# Patient Record
Sex: MALE | Race: OTHER | Employment: OTHER | URBAN - METROPOLITAN AREA
[De-identification: names, ages, dates, MRNs, and addresses within clinical notes are randomized per-mention and may not be internally consistent; named-entity substitution may affect disease eponyms.]

---

## 2017-11-30 ENCOUNTER — HOSPITAL ENCOUNTER (INPATIENT)
Age: 67
LOS: 1 days | Discharge: ANOTHER HOSPITAL | DRG: 378 | End: 2017-12-01
Attending: STUDENT IN AN ORGANIZED HEALTH CARE EDUCATION/TRAINING PROGRAM | Admitting: FAMILY MEDICINE
Payer: SELF-PAY

## 2017-11-30 ENCOUNTER — APPOINTMENT (OUTPATIENT)
Dept: CT IMAGING | Age: 67
DRG: 378 | End: 2017-11-30
Attending: FAMILY MEDICINE
Payer: SELF-PAY

## 2017-11-30 ENCOUNTER — APPOINTMENT (OUTPATIENT)
Dept: GENERAL RADIOLOGY | Age: 67
DRG: 378 | End: 2017-11-30
Attending: STUDENT IN AN ORGANIZED HEALTH CARE EDUCATION/TRAINING PROGRAM
Payer: SELF-PAY

## 2017-11-30 DIAGNOSIS — K92.2 GASTROINTESTINAL HEMORRHAGE, UNSPECIFIED GASTROINTESTINAL HEMORRHAGE TYPE: Primary | ICD-10-CM

## 2017-11-30 LAB
ALBUMIN SERPL-MCNC: 3.2 G/DL (ref 3.5–5)
ALBUMIN/GLOB SERPL: 0.5 {RATIO} (ref 1.1–2.2)
ALP SERPL-CCNC: 128 U/L (ref 45–117)
ALT SERPL-CCNC: 38 U/L (ref 12–78)
ANION GAP SERPL CALC-SCNC: 9 MMOL/L (ref 5–15)
AST SERPL-CCNC: 33 U/L (ref 15–37)
BASOPHILS # BLD: 0 K/UL (ref 0–0.1)
BASOPHILS NFR BLD: 0 % (ref 0–1)
BILIRUB SERPL-MCNC: 0.5 MG/DL (ref 0.2–1)
BUN SERPL-MCNC: 21 MG/DL (ref 6–20)
BUN/CREAT SERPL: 15 (ref 12–20)
CALCIUM SERPL-MCNC: 10.2 MG/DL (ref 8.5–10.1)
CHLORIDE SERPL-SCNC: 94 MMOL/L (ref 97–108)
CO2 SERPL-SCNC: 31 MMOL/L (ref 21–32)
CREAT SERPL-MCNC: 1.43 MG/DL (ref 0.7–1.3)
DIFFERENTIAL METHOD BLD: ABNORMAL
EOSINOPHIL # BLD: 0 K/UL (ref 0–0.4)
EOSINOPHIL NFR BLD: 0 % (ref 0–7)
ERYTHROCYTE [DISTWIDTH] IN BLOOD BY AUTOMATED COUNT: 15.8 % (ref 11.5–14.5)
GLOBULIN SER CALC-MCNC: 6.9 G/DL (ref 2–4)
GLUCOSE SERPL-MCNC: 185 MG/DL (ref 65–100)
HCT VFR BLD AUTO: 37.7 % (ref 36.6–50.3)
HEMOCCULT STL QL: POSITIVE
HGB BLD-MCNC: 12.1 G/DL (ref 12.1–17)
LACTATE SERPL-SCNC: 2.1 MMOL/L (ref 0.4–2)
LYMPHOCYTES # BLD: 0.5 K/UL (ref 0.8–3.5)
LYMPHOCYTES NFR BLD: 4 % (ref 12–49)
MCH RBC QN AUTO: 27.6 PG (ref 26–34)
MCHC RBC AUTO-ENTMCNC: 32.1 G/DL (ref 30–36.5)
MCV RBC AUTO: 85.9 FL (ref 80–99)
MONOCYTES # BLD: 0.4 K/UL (ref 0–1)
MONOCYTES NFR BLD: 3 % (ref 5–13)
NEUTS SEG # BLD: 12.7 K/UL (ref 1.8–8)
NEUTS SEG NFR BLD: 93 % (ref 32–75)
PLATELET # BLD AUTO: 478 K/UL (ref 150–400)
POTASSIUM SERPL-SCNC: 3.8 MMOL/L (ref 3.5–5.1)
PROT SERPL-MCNC: 10.1 G/DL (ref 6.4–8.2)
RBC # BLD AUTO: 4.39 M/UL (ref 4.1–5.7)
RBC MORPH BLD: ABNORMAL
SODIUM SERPL-SCNC: 134 MMOL/L (ref 136–145)
WBC # BLD AUTO: 13.6 K/UL (ref 4.1–11.1)

## 2017-11-30 PROCEDURE — 85025 COMPLETE CBC W/AUTO DIFF WBC: CPT | Performed by: STUDENT IN AN ORGANIZED HEALTH CARE EDUCATION/TRAINING PROGRAM

## 2017-11-30 PROCEDURE — 99284 EMERGENCY DEPT VISIT MOD MDM: CPT

## 2017-11-30 PROCEDURE — 71010 XR CHEST PORT: CPT

## 2017-11-30 PROCEDURE — 96375 TX/PRO/DX INJ NEW DRUG ADDON: CPT

## 2017-11-30 PROCEDURE — 65660000000 HC RM CCU STEPDOWN

## 2017-11-30 PROCEDURE — 82272 OCCULT BLD FECES 1-3 TESTS: CPT | Performed by: STUDENT IN AN ORGANIZED HEALTH CARE EDUCATION/TRAINING PROGRAM

## 2017-11-30 PROCEDURE — 96361 HYDRATE IV INFUSION ADD-ON: CPT

## 2017-11-30 PROCEDURE — 74011250636 HC RX REV CODE- 250/636: Performed by: STUDENT IN AN ORGANIZED HEALTH CARE EDUCATION/TRAINING PROGRAM

## 2017-11-30 PROCEDURE — 80053 COMPREHEN METABOLIC PANEL: CPT | Performed by: STUDENT IN AN ORGANIZED HEALTH CARE EDUCATION/TRAINING PROGRAM

## 2017-11-30 PROCEDURE — 96374 THER/PROPH/DIAG INJ IV PUSH: CPT

## 2017-11-30 PROCEDURE — 83605 ASSAY OF LACTIC ACID: CPT | Performed by: STUDENT IN AN ORGANIZED HEALTH CARE EDUCATION/TRAINING PROGRAM

## 2017-11-30 PROCEDURE — 93005 ELECTROCARDIOGRAM TRACING: CPT

## 2017-11-30 PROCEDURE — 74011000250 HC RX REV CODE- 250: Performed by: STUDENT IN AN ORGANIZED HEALTH CARE EDUCATION/TRAINING PROGRAM

## 2017-11-30 PROCEDURE — C9113 INJ PANTOPRAZOLE SODIUM, VIA: HCPCS | Performed by: STUDENT IN AN ORGANIZED HEALTH CARE EDUCATION/TRAINING PROGRAM

## 2017-11-30 PROCEDURE — 36415 COLL VENOUS BLD VENIPUNCTURE: CPT | Performed by: STUDENT IN AN ORGANIZED HEALTH CARE EDUCATION/TRAINING PROGRAM

## 2017-11-30 PROCEDURE — 74176 CT ABD & PELVIS W/O CONTRAST: CPT

## 2017-11-30 RX ORDER — ONDANSETRON 4 MG/1
4 TABLET, ORALLY DISINTEGRATING ORAL
COMMUNITY

## 2017-11-30 RX ORDER — ACETAMINOPHEN 325 MG/1
650 TABLET ORAL EVERY 8 HOURS
COMMUNITY

## 2017-11-30 RX ORDER — AMLODIPINE BESYLATE 10 MG/1
10 TABLET ORAL DAILY
COMMUNITY

## 2017-11-30 RX ORDER — ACETAMINOPHEN 160 MG/5ML
320 LIQUID ORAL
COMMUNITY

## 2017-11-30 RX ORDER — ONDANSETRON 2 MG/ML
4 INJECTION INTRAMUSCULAR; INTRAVENOUS
Status: COMPLETED | OUTPATIENT
Start: 2017-11-30 | End: 2017-11-30

## 2017-11-30 RX ORDER — GUAIFENESIN 100 MG/5ML
81 LIQUID (ML) ORAL DAILY
COMMUNITY
End: 2017-12-01

## 2017-11-30 RX ORDER — FAMOTIDINE 40 MG/5ML
1 POWDER, FOR SUSPENSION ORAL
COMMUNITY

## 2017-11-30 RX ORDER — CLONIDINE HYDROCHLORIDE 0.2 MG/1
0.2 TABLET ORAL EVERY 12 HOURS
COMMUNITY

## 2017-11-30 RX ORDER — HYDRALAZINE HYDROCHLORIDE 20 MG/ML
10 INJECTION INTRAMUSCULAR; INTRAVENOUS
Status: DISCONTINUED | OUTPATIENT
Start: 2017-11-30 | End: 2017-12-02 | Stop reason: HOSPADM

## 2017-11-30 RX ORDER — ATORVASTATIN CALCIUM 40 MG/1
40 TABLET, FILM COATED ORAL DAILY
COMMUNITY

## 2017-11-30 RX ORDER — CLOPIDOGREL BISULFATE 75 MG/1
75 TABLET ORAL DAILY
COMMUNITY
End: 2017-12-01

## 2017-11-30 RX ADMIN — SODIUM CHLORIDE 1000 ML: 900 INJECTION, SOLUTION INTRAVENOUS at 19:55

## 2017-11-30 RX ADMIN — ONDANSETRON 4 MG: 2 INJECTION INTRAMUSCULAR; INTRAVENOUS at 19:55

## 2017-11-30 RX ADMIN — SODIUM CHLORIDE 40 MG: 9 INJECTION, SOLUTION INTRAMUSCULAR; INTRAVENOUS; SUBCUTANEOUS at 20:49

## 2017-11-30 NOTE — ED TRIAGE NOTES
Pt arrives via EMS from [facility] c/o bloody stools that started yesterday and vomiting that started today. Per EMS pt has been on a clear liquid diet starting today. Pt arrives with PEG in place. Per EMS Glucose 212 in route.

## 2017-12-01 VITALS
RESPIRATION RATE: 16 BRPM | SYSTOLIC BLOOD PRESSURE: 167 MMHG | OXYGEN SATURATION: 99 % | HEART RATE: 100 BPM | BODY MASS INDEX: 21.35 KG/M2 | TEMPERATURE: 97.8 F | DIASTOLIC BLOOD PRESSURE: 90 MMHG | WEIGHT: 113.1 LBS | HEIGHT: 61 IN

## 2017-12-01 LAB
ALBUMIN SERPL-MCNC: 3 G/DL (ref 3.5–5)
ALBUMIN/GLOB SERPL: 0.4 {RATIO} (ref 1.1–2.2)
ALP SERPL-CCNC: 129 U/L (ref 45–117)
ALT SERPL-CCNC: 37 U/L (ref 12–78)
ANION GAP SERPL CALC-SCNC: 10 MMOL/L (ref 5–15)
APPEARANCE UR: ABNORMAL
AST SERPL-CCNC: 44 U/L (ref 15–37)
ATRIAL RATE: 75 BPM
BACTERIA URNS QL MICRO: ABNORMAL /HPF
BASOPHILS # BLD: 0 K/UL (ref 0–0.1)
BASOPHILS NFR BLD: 0 % (ref 0–1)
BILIRUB SERPL-MCNC: 0.4 MG/DL (ref 0.2–1)
BILIRUB UR QL CFM: NEGATIVE
BUN SERPL-MCNC: 17 MG/DL (ref 6–20)
BUN/CREAT SERPL: 15 (ref 12–20)
C DIFF TOX GENS STL QL NAA+PROBE: NEGATIVE
CALCIUM SERPL-MCNC: 9.4 MG/DL (ref 8.5–10.1)
CALCULATED P AXIS, ECG09: 21 DEGREES
CALCULATED R AXIS, ECG10: -15 DEGREES
CALCULATED T AXIS, ECG11: 92 DEGREES
CHLORIDE SERPL-SCNC: 97 MMOL/L (ref 97–108)
CO2 SERPL-SCNC: 28 MMOL/L (ref 21–32)
COLOR UR: ABNORMAL
CREAT SERPL-MCNC: 1.12 MG/DL (ref 0.7–1.3)
DIAGNOSIS, 93000: NORMAL
EOSINOPHIL # BLD: 0 K/UL (ref 0–0.4)
EOSINOPHIL NFR BLD: 0 % (ref 0–7)
EPITH CASTS URNS QL MICRO: ABNORMAL /LPF
ERYTHROCYTE [DISTWIDTH] IN BLOOD BY AUTOMATED COUNT: 16 % (ref 11.5–14.5)
GLOBULIN SER CALC-MCNC: 6.9 G/DL (ref 2–4)
GLUCOSE BLD STRIP.AUTO-MCNC: 113 MG/DL (ref 65–100)
GLUCOSE BLD STRIP.AUTO-MCNC: 125 MG/DL (ref 65–100)
GLUCOSE BLD STRIP.AUTO-MCNC: 127 MG/DL (ref 65–100)
GLUCOSE BLD STRIP.AUTO-MCNC: 135 MG/DL (ref 65–100)
GLUCOSE SERPL-MCNC: 138 MG/DL (ref 65–100)
GLUCOSE UR STRIP.AUTO-MCNC: 100 MG/DL
HCT VFR BLD AUTO: 36.3 % (ref 36.6–50.3)
HGB BLD-MCNC: 11.5 G/DL (ref 12.1–17)
HGB UR QL STRIP: ABNORMAL
KETONES UR QL STRIP.AUTO: 15 MG/DL
LACTATE SERPL-SCNC: 1.6 MMOL/L (ref 0.4–2)
LEUKOCYTE ESTERASE UR QL STRIP.AUTO: ABNORMAL
LYMPHOCYTES # BLD: 1.4 K/UL (ref 0.8–3.5)
LYMPHOCYTES NFR BLD: 8 % (ref 12–49)
MCH RBC QN AUTO: 27.1 PG (ref 26–34)
MCHC RBC AUTO-ENTMCNC: 31.7 G/DL (ref 30–36.5)
MCV RBC AUTO: 85.6 FL (ref 80–99)
MONOCYTES # BLD: 1 K/UL (ref 0–1)
MONOCYTES NFR BLD: 6 % (ref 5–13)
NEUTS SEG # BLD: 15.5 K/UL (ref 1.8–8)
NEUTS SEG NFR BLD: 86 % (ref 32–75)
NITRITE UR QL STRIP.AUTO: POSITIVE
P-R INTERVAL, ECG05: 148 MS
PH UR STRIP: 7 [PH] (ref 5–8)
PLATELET # BLD AUTO: 505 K/UL (ref 150–400)
POTASSIUM SERPL-SCNC: 4.2 MMOL/L (ref 3.5–5.1)
PROT SERPL-MCNC: 9.9 G/DL (ref 6.4–8.2)
PROT UR STRIP-MCNC: >300 MG/DL
Q-T INTERVAL, ECG07: 428 MS
QRS DURATION, ECG06: 88 MS
QTC CALCULATION (BEZET), ECG08: 477 MS
RBC # BLD AUTO: 4.24 M/UL (ref 4.1–5.7)
RBC #/AREA URNS HPF: >100 /HPF (ref 0–5)
SERVICE CMNT-IMP: ABNORMAL
SODIUM SERPL-SCNC: 135 MMOL/L (ref 136–145)
SP GR UR REFRACTOMETRY: 1.01 (ref 1–1.03)
UA: UC IF INDICATED,UAUC: ABNORMAL
UROBILINOGEN UR QL STRIP.AUTO: 2 EU/DL (ref 0.2–1)
VENTRICULAR RATE, ECG03: 75 BPM
WBC # BLD AUTO: 17.9 K/UL (ref 4.1–11.1)
WBC URNS QL MICRO: ABNORMAL /HPF (ref 0–4)

## 2017-12-01 PROCEDURE — 74011250637 HC RX REV CODE- 250/637: Performed by: FAMILY MEDICINE

## 2017-12-01 PROCEDURE — 81001 URINALYSIS AUTO W/SCOPE: CPT | Performed by: INTERNAL MEDICINE

## 2017-12-01 PROCEDURE — 87493 C DIFF AMPLIFIED PROBE: CPT | Performed by: HOSPITALIST

## 2017-12-01 PROCEDURE — 87186 SC STD MICRODIL/AGAR DIL: CPT | Performed by: INTERNAL MEDICINE

## 2017-12-01 PROCEDURE — 80053 COMPREHEN METABOLIC PANEL: CPT | Performed by: FAMILY MEDICINE

## 2017-12-01 PROCEDURE — 74011250636 HC RX REV CODE- 250/636: Performed by: HOSPITALIST

## 2017-12-01 PROCEDURE — 82962 GLUCOSE BLOOD TEST: CPT

## 2017-12-01 PROCEDURE — 87077 CULTURE AEROBIC IDENTIFY: CPT | Performed by: INTERNAL MEDICINE

## 2017-12-01 PROCEDURE — 74011250636 HC RX REV CODE- 250/636: Performed by: FAMILY MEDICINE

## 2017-12-01 PROCEDURE — 36415 COLL VENOUS BLD VENIPUNCTURE: CPT | Performed by: FAMILY MEDICINE

## 2017-12-01 PROCEDURE — 74011000250 HC RX REV CODE- 250: Performed by: FAMILY MEDICINE

## 2017-12-01 PROCEDURE — 85025 COMPLETE CBC W/AUTO DIFF WBC: CPT | Performed by: FAMILY MEDICINE

## 2017-12-01 PROCEDURE — C9113 INJ PANTOPRAZOLE SODIUM, VIA: HCPCS | Performed by: FAMILY MEDICINE

## 2017-12-01 PROCEDURE — 65660000000 HC RM CCU STEPDOWN

## 2017-12-01 PROCEDURE — 83605 ASSAY OF LACTIC ACID: CPT | Performed by: FAMILY MEDICINE

## 2017-12-01 PROCEDURE — 87086 URINE CULTURE/COLONY COUNT: CPT | Performed by: INTERNAL MEDICINE

## 2017-12-01 RX ORDER — ATORVASTATIN CALCIUM 40 MG/1
40 TABLET, FILM COATED ORAL DAILY
Status: DISCONTINUED | OUTPATIENT
Start: 2017-12-01 | End: 2017-12-02 | Stop reason: HOSPADM

## 2017-12-01 RX ORDER — SODIUM CHLORIDE 9 MG/ML
75 INJECTION, SOLUTION INTRAVENOUS CONTINUOUS
Status: DISCONTINUED | OUTPATIENT
Start: 2017-12-01 | End: 2017-12-02 | Stop reason: HOSPADM

## 2017-12-01 RX ORDER — CLONIDINE HYDROCHLORIDE 0.2 MG/1
0.2 TABLET ORAL EVERY 12 HOURS
Status: DISCONTINUED | OUTPATIENT
Start: 2017-12-01 | End: 2017-12-02 | Stop reason: HOSPADM

## 2017-12-01 RX ORDER — AMLODIPINE BESYLATE 5 MG/1
10 TABLET ORAL DAILY
Status: DISCONTINUED | OUTPATIENT
Start: 2017-12-01 | End: 2017-12-02 | Stop reason: HOSPADM

## 2017-12-01 RX ORDER — SODIUM CHLORIDE 0.9 % (FLUSH) 0.9 %
5-10 SYRINGE (ML) INJECTION AS NEEDED
Status: DISCONTINUED | OUTPATIENT
Start: 2017-12-01 | End: 2017-12-02 | Stop reason: HOSPADM

## 2017-12-01 RX ORDER — SODIUM CHLORIDE 0.9 % (FLUSH) 0.9 %
5-10 SYRINGE (ML) INJECTION EVERY 8 HOURS
Status: DISCONTINUED | OUTPATIENT
Start: 2017-12-01 | End: 2017-12-02 | Stop reason: HOSPADM

## 2017-12-01 RX ADMIN — Medication 10 ML: at 22:14

## 2017-12-01 RX ADMIN — Medication 10 ML: at 06:51

## 2017-12-01 RX ADMIN — SODIUM CHLORIDE 40 MG: 9 INJECTION INTRAMUSCULAR; INTRAVENOUS; SUBCUTANEOUS at 22:12

## 2017-12-01 RX ADMIN — SODIUM CHLORIDE 75 ML/HR: 900 INJECTION, SOLUTION INTRAVENOUS at 01:30

## 2017-12-01 RX ADMIN — Medication 10 ML: at 13:21

## 2017-12-01 RX ADMIN — NITROGLYCERIN 1 INCH: 20 OINTMENT TOPICAL at 01:15

## 2017-12-01 RX ADMIN — SODIUM CHLORIDE 40 MG: 9 INJECTION INTRAMUSCULAR; INTRAVENOUS; SUBCUTANEOUS at 09:13

## 2017-12-01 RX ADMIN — PIPERACILLIN SODIUM AND TAZOBACTAM SODIUM 3.38 G: .375; 3 INJECTION, POWDER, LYOPHILIZED, FOR SOLUTION INTRAVENOUS at 11:49

## 2017-12-01 RX ADMIN — HYDRALAZINE HYDROCHLORIDE 10 MG: 20 INJECTION INTRAMUSCULAR; INTRAVENOUS at 22:13

## 2017-12-01 RX ADMIN — Medication 10 ML: at 01:17

## 2017-12-01 RX ADMIN — VANCOMYCIN HYDROCHLORIDE 125 MG: 1 INJECTION, POWDER, LYOPHILIZED, FOR SOLUTION INTRAVENOUS at 11:49

## 2017-12-01 RX ADMIN — PIPERACILLIN AND TAZOBACTAM 10.12 G: 3; .375 INJECTION, POWDER, FOR SOLUTION INTRAVENOUS at 13:20

## 2017-12-01 NOTE — DISCHARGE SUMMARY
Discharge Summary       PATIENT ID: Junior Bhandari  MRN: [id number]   YOB: 1950    DATE OF ADMISSION: 11/30/2017  5:35 PM    DATE OF DISCHARGE: 12/1/17   PRIMARY CARE PROVIDER: None     ATTENDING PHYSICIAN: Hung Soni  DISCHARGING PROVIDER: Rodney Rousseau MD    To contact this individual call [phone] and ask the  to page. If unavailable ask to be transferred the Adult Hospitalist Department. CONSULTATIONS: IP CONSULT TO HOSPITALIST  IP CONSULT TO GASTROENTEROLOGY    PROCEDURES/SURGERIES: * No surgery found *    ADMITTING DIAGNOSES & HOSPITAL COURSE:   HISTORY OF PRESENT ILLNESS: The patient is a 58-year-old male   with past medical history of coronary artery disease, colon cancer,   stroke, hypertension and GERD, who presents to the hospital with the   above mentioned symptoms. The patient has a history of recent stroke   and thus history was obtained from the son, who is present at the   bedside. The son reports that patient had a colonoscopy done and was   diagnosed with colon cancer and at almost the same time the patient   also had some \"heart problems\" and was found to have coronary   artery disease. The patient was taken for coronary artery bypass   grafting at [address] [place] [address] about a month, month and half back, underwent the   surgery and while he was in the ICU, had a stroke. Per son, the patient   was unable to walk and also had a lot of trouble swallowing and trouble   with speech. The patient eventually had a PEG tube placed and was   sent to [facility]. The patient is recovering well. Per son, he has   regained some speech, he is able to swallow some of his food, and he   is able to walk with the help of a walker and has good strength in his   arms. The son reports that the patient was evaluated by Colorectal   Surgery recently and they are planning to operate on him for colon   cancer after [place] if all the other morbidities are optimized.  Per   son, the patient started developing some poor appetite associated with   constipation about 2 days back. He was given a stool softener and   started complaining of some nausea and vomiting today, and then was   found to have some stools with dark maroon blood yesterday. The   patient also started having some nausea and vomiting associated with   some hematemesis earlier today and the son got concerned and   decided to bring him to the hospital. Currently, the patient appears to   be stable, resting in bed. Per son, the patient has not complained of   any headache, blurry vision, sore throat, trouble swallowing, trouble   with speech other than usual. No chest pain, no shortness of breath. No recent cough, no abdominal pain. No change in neurological status   or falls, injuries or any other concerns or problems.           Assessment & Plan:      1. Gastrointestinal bleeding, appears to be both upper and lower. - hold asa / plavix which was started for a recent stroke    - h/o colon ca - need report from colorectal surgeons office  - cbc q 12 hrs and IV meds cont PPI      2. UTI POA-  - Will start zosyn follow up cultures  - pt was on oral vanc for ? c diff need records   - cant get it for NPO status  - GI to evaluate     2. History of coronary artery disease. Continue to monitor.  - cont home meds  Anti plt stopped for above     3. Hypertension. Currently the patient appears to be suboptimally   Controlled.   - cont IV meds for now     Disposition: [facility] Dr. Flip Kessler ( oncological surgeon )  accepted pt   Pt has a h/o CABG/ STROKE AND COLON CA S/P STENT and follows at [address] [place] [address] by Dr. Flip Kessler , requested for transfer and agreed to plan   D/w pt son updated that pt is being transfered to [address] [place] [address]           PENDING TEST RESULTS:   At the time of discharge the following test results are still pending:     FOLLOW UP APPOINTMENTS:    Follow-up Information     Follow up With Details Comments Contact Info    None  transfer to [facility] None ([phone] Patient stated that they have no PCP             ADDITIONAL CARE RECOMMENDATIONS:     DIET: npo    ACTIVITY: Activity as tolerated    WOUND CARE:     EQUIPMENT needed:       DISCHARGE MEDICATIONS:  Current Discharge Medication List      CONTINUE these medications which have NOT CHANGED    Details   amLODIPine (NORVASC) 10 mg tablet 10 mg by PEG Tube route daily. atorvastatin (LIPITOR) 40 mg tablet 40 mg by PEG Tube route daily. cloNIDine HCl (CATAPRES) 0.2 mg tablet 0.2 mg by PEG Tube route every twelve (12) hours. famotidine (PEPCID) 40 mg/5 mL (8 mg/mL) suspension 1 tsp by PEG Tube route nightly. vancomycin 125 mg/2.5 mL syrg 125 mg by PEG Tube route every six (6) hours. ondansetron (ZOFRAN ODT) 4 mg disintegrating tablet 4 mg by PEG Tube route every four (4) hours as needed for Nausea. X 24hours      acetaminophen (TYLENOL) 325 mg tablet 650 mg by PEG Tube route every eight (8) hours. L. ACIDOPHILUS/L.BULGARICUS (FLORANEX PO) 1 Packet by PEG Tube route three (3) times daily. guar gum (BENEFIBER) packet 1 Packet by PEG Tube route three (3) times daily (with meals). acetaminophen (TYLENOL) 160 mg/5 mL liquid 320 mg by PEG Tube route every six (6) hours as needed for Pain. STOP taking these medications       clopidogrel (PLAVIX) 75 mg tab Comments:   Reason for Stopping:         aspirin 81 mg chewable tablet Comments:   Reason for Stopping:                 NOTIFY YOUR PHYSICIAN FOR ANY OF THE FOLLOWING:   Fever over 101 degrees for 24 hours. Chest pain, shortness of breath, fever, chills, nausea, vomiting, diarrhea, change in mentation, falling, weakness, bleeding. Severe pain or pain not relieved by medications. Or, any other signs or symptoms that you may have questions about.     DISPOSITION:    Home With:   OT  PT  HH  RN       Long term SNF/Inpatient Rehab    Independent/assisted living    Hospice   x Other: [facility]       PATIENT CONDITION AT DISCHARGE:     Functional status   x Poor Deconditioned     Independent      Cognition     Lucid    x Forgetful     Dementia      Catheters/lines (plus indication)    Fink     PICC     PEG    x None      Code status    x Full code     DNR      PHYSICAL EXAMINATION AT DISCHARGE:   Refer to Progress Note      CHRONIC MEDICAL DIAGNOSES:  Problem List as of 12/1/2017  Date Reviewed: 12/1/2017          Codes Class Noted - Resolved    * (Principal)GI bleed ICD-10-CM: K92.2  ICD-9-CM: 578.9  11/30/2017 - Present              Greater than 30  minutes were spent with the patient on counseling and coordination of care    Signed:   Robin Hollingsworth MD  12/1/2017  1:38 PM

## 2017-12-01 NOTE — PROGRESS NOTES
Received update that patient is from [facility]. Reached out to [facility] Liaison for [facility] on cell:  [phone]. Referral has been made to facility via [facility] and request for Nat to assist in researching patient's payor source. Patient is showing as Self-Pay currently in system.     Divya Dickey RN

## 2017-12-01 NOTE — PROGRESS NOTES
NUTRITION COMPLETE ASSESSMENT    RECOMMENDATIONS:   1. Per GI resume diet and tube feeds:   - puree + Ensure Compact TID    - Jevity 1.5 @ 30ml/hr x 12 hrs (7p to 7a) + 100ml flush q4hr throughout the day  2. Assist with all meals and supplements  3. Daily weights     Interventions/Plan:   Food/Nutrient Delivery:    Commercial supplement (once diet advanced add Ensure Compact TID) Feeding assistance at meals   Initiate enteral nutrition    Assessment:   Reason for Assessment: [x]BPA/MST Referral     Diet: NPO  Supplements: none  Nutritionally Significant Medications: [x] Reviewed & Includes: protonix, vanco, NS @ 75ml/hr    Pre-Hospitalization:  Usual Appetite: Fair  Diet at Home: puree  Vitamins/Supplements: Yes (Houseshake TID + nocturnal feeds)    Subjective:  Aphasia. No family at bedside. Objective:  Pt admitted for GI bleed from [facility]. PMHx: CAD, colon CA, stroke, HTN, GERD. N/V prior to admit. Recent CABG with stroke post-op, about 1 month ago per H&P. New dx of colon CA during that admit. GI consulted. Existing PEG in placed prior to admit d/t dysphagia s/p stroke. Now able to take some PO with puree pleasure trays. Spoke to facility staff who note pt eating about 50% B, 34-40%L and <35%D trays with Houseshakes TID (~600kcal, 27g protein if 100% consumed). Does get nocturnal feeds of: Jevity 1.5, 30ml/hr x 12hrs (7p to 7a) + 100ml flush q4hr throughout the day. Provides: 360ml, 540kcal, 23g protein, 274ml free fluid + 600ml flush = 874ml fluid. If 100% supplements consumed and with reported PO pt likely to be meeting nutrition needs. However, wt loss of >33# noted per Malnutrition Screen but no wt hx available or family present for interview. Some wt loss likely d/t loss of muscle mass s/p stroke but will continue to monitor wt trends. Recommend starting with PTA tube feeds regimen (see above), if wt loss noted and/or PO intake not back to baseline may need to increase volume.      Will continue to follow for GI consult/recommendations, restart of diet/tube feeds, PO intake, and wt trends. Estimated Nutrition Needs:   Kcals/day: 1475 Kcals/day (1475-1587kcal)  Protein: 62 g (62-67g (1.2-1.3g/kg))  Fluid: 1500 ml (1ml/kcal)  Based On: Jolly Dang (x 1.3-1.4)  Weight Used: Actual wt (51.3kg)    Pt expected to meet estimated nutrient needs:  []   Yes     [x]  No (without enteral feeds) [] Unable to predict at this time  Nutrition Diagnosis:   1. Inadequate oral intake related to swallowing difficulty as evidenced by dysphagia s/p stroke; PEG with noturnal feeds; puree pleasure trays    2. Unintended weight loss related to physical inactivity, inadequate protein/energy intake PTA as evidenced by recent stroke/CABG/CA dx; reports of wt loss    Goals:     Restart of diet/tube feeds in 1-2 days; wt maintenance     Monitoring & Evaluation:    - Enteral/parenteral nutrition intake, Liquid meal replacement, Total energy intake   - Weight/weight change, GI     Previous Nutrition Goals Met:   N/A  Previous Recommendations:    N/A    Education & Discharge Needs:   [x] None Identified   [] Identified and addressed    [] Participated in care plan, discharge planning, and/or interdisciplinary rounds        Cultural, Adventist and ethnic food preferences identified: None    Skin Integrity: [x]Intact  []Other  Edema: [x]None []Other  Last BM: 11/30  Food Allergies: [x]None []Other  Diet Restrictions: Cultural/Oriental orthodox Preference(s): None     Anthropometrics:    Weight Loss Metrics 12/1/2017 11/30/2017   Today's Wt 113 lb 1.5 oz -   BMI - 21.37 kg/m2      Weight Source: Bed  Height: 5' 1\" (154.9 cm) ([facility] staff reports),    Body mass index is 21.37 kg/(m^2).   IBW : 50.8 kg (112 lb), % IBW (Calculated): 100.98 %   ,      Labs:    Lab Results   Component Value Date/Time    Sodium 135 12/01/2017 01:15 AM    Potassium 4.2 12/01/2017 01:15 AM    Chloride 97 12/01/2017 01:15 AM    CO2 28 12/01/2017 01:15 AM Glucose 138 12/01/2017 01:15 AM    BUN 17 12/01/2017 01:15 AM    Creatinine 1.12 12/01/2017 01:15 AM    Calcium 9.4 12/01/2017 01:15 AM    Albumin 3.0 12/01/2017 01:15 AM       Eleni Veras RD

## 2017-12-01 NOTE — PROGRESS NOTES
Day #1 of Zosyn  Indication:  UTI  Current regimen:  3.375g IV Q8h    Recent Labs      17   0115  17   0105  17   1840   WBC   --   17.9*  13.6*   CREA  1.12   --   1.43*   BUN  17   --   21*     Est CrCl: 45.2 ml/min  Temp (24hrs), Av.2 °F (36.8 °C), Min:98 °F (36.7 °C), Max:98.4 °F (36.9 °C)    Cultures:    urine - in process    Plan: Change to 3.375g followed by 10.125g Q24h.

## 2017-12-01 NOTE — ROUTINE PROCESS
TRANSFER - OUT REPORT:    Verbal report given to Radha Rojo RN on [employer]  being transferred to [facility]) for routine progression of care       Report consisted of patients Situation, Background, Assessment and   Recommendations(SBAR). Information from the following report(s) SBAR, Kardex, ED Summary, [facility] and Recent Results was reviewed with the receiving nurse. Lines:   Peripheral IV 11/30/17 Left Antecubital (Active)   Site Assessment Clean, dry, & intact 11/30/2017  8:06 PM   Phlebitis Assessment 0 11/30/2017  8:06 PM   Infiltration Assessment 0 11/30/2017  8:06 PM   Dressing Status Clean, dry, & intact 11/30/2017  8:06 PM        Opportunity for questions and clarification was provided.       Patient transported with:   Registered Nurse

## 2017-12-01 NOTE — PROGRESS NOTES
0005: Pt arrived to unit with wife and son at bedside;pt stable with no complaints of pain at this time. BP elevated at 180/88; catapres held d/t NPO status  MD paged regarding BP,elevated lactic (2.1), and elevated WBC (13.6) that was not addressed in the ER; orders received to re-draw lactic now then every 4 hrs. until less than 2; Nitrobid 1 in. Ordered for BP control     Primary Nurse Jose Wakefield RN and Annabel Scott RN performed a dual skin assessment on this patient No impairment noted  Daniel score is 21   Unable to complete admission bedside swallow eval; pt NPO    0255: Spoke with Dr. Sheridan Harper about pt's increasing WBC count and orders that were placed for Vancomycin 50mg PO q6, advised to hold vanc at this time considering pt is NPO; no new orders received    Bedside and Verbal shift change report given to Mikayla Dumont (oncoming nurse) by Chris Garcia (offgoing nurse). Report included the following information SBAR, Kardex, ED Summary, Intake/Output, MAR, Recent Results and Cardiac Rhythm NSR.

## 2017-12-01 NOTE — CONSULTS
[address] [facility].   [address] [facility] [address]        GASTROENTEROLOGY CONSULTATION NOTE  Will Dg Kiran  [phone] office  [phone] NP/PA in-hospital cell phone M-F until 4:30PM  After 5PM or on weekends, please call  for physician on call        NAME:  Estiven Bagley   :   1950   MRN:   [id number]       Referring Physician: Dr. Joanna Clarke Date: 2017 10:49 AM    Chief Complaint: unable to obtain     History of Present Illness:  Patient is a 69 y.o. who is seen in consultation at the request of Dr. Landy Patton for upper GI endoscopy for upper GI bleeding. Patient has a past medical history of coronary artery disease, colon cancer, stroke, hypertension, and GERD. Patient has a history of a recent stroke. All of the history was obtained from the patient's records. He is unable to provide any history and there is no family present. I talked to the patient's son on the phone who was unable to talk as he was driving to the hospital.  Per the records, patient had a CABG at [facility] approximately 1 month ago, and had a stroke postoperatively. Patient was unable to walk. He had difficulties with speech and swallowing, and a PEG tube was placed. Patient was evaluated by colorectal surgery and they are planning surgery for colon cancer after [place] and all other morbidities are optimized. Patient has had a poor appetite with associated constipation 2 days ago and was given a stool softener. He started having nausea, vomiting, and stools with dark maroon blood.  + Hematemesis. Discussed history with the patient's nurse. He has had 1 bowel movement today with bright red blood. Asprin and Plavix are being held. I have reviewed the emergency room note, hospital admission note, notes by all other clinicians who have seen the patient during this hospitalization to date. I have reviewed the problem list and the reason for this hospitalization.  I have reviewed the allergies and the medications the patient was taking at home prior to this hospitalization. PMH:  Past Medical History:   Diagnosis Date    CAD (coronary artery disease)     [facility] ([facility]     Colon    Gastrointestinal disorder     GERD    Hypertension     [facility]        PSH:  History reviewed. No pertinent surgical history. Allergies:  No Known Allergies    Home Medications:  Prior to Admission Medications   Prescriptions Last Dose Informant Patient Reported? Taking? L. ACIDOPHILUS/L.BULGARICUS (FLORANEX PO) 2017 at AM  Yes Yes   Si Packet by PEG Tube route three (3) times daily. acetaminophen (TYLENOL) 160 mg/5 mL liquid   Yes Yes   Sig: 320 mg by PEG Tube route every six (6) hours as needed for Pain. acetaminophen (TYLENOL) 325 mg tablet 2017 at AM  Yes Yes   Si mg by PEG Tube route every eight (8) hours. amLODIPine (NORVASC) 10 mg tablet 2017 at AM  Yes Yes   Sig: 10 mg by PEG Tube route daily. aspirin 81 mg chewable tablet 2017 at AM  Yes Yes   Si mg by PEG Tube route daily. atorvastatin (LIPITOR) 40 mg tablet 2017 at AM  Yes Yes   Si mg by PEG Tube route daily. cloNIDine HCl (CATAPRES) 0.2 mg tablet 2017 at AM  Yes Yes   Si.2 mg by PEG Tube route every twelve (12) hours. clopidogrel (PLAVIX) 75 mg tab 2017 at AM  Yes Yes   Si mg by PEG Tube route daily. famotidine (PEPCID) 40 mg/5 mL (8 mg/mL) suspension 2017 at HS  Yes Yes   Si tsp by PEG Tube route nightly. guar gum (BENEFIBER) packet 2017 at AM  Yes Yes   Si Packet by PEG Tube route three (3) times daily (with meals). ondansetron (ZOFRAN ODT) 4 mg disintegrating tablet 2017 at 1400  Yes Yes   Si mg by PEG Tube route every four (4) hours as needed for Nausea. X 24hours   vancomycin 125 mg/2.5 mL syrg 2017 at AM  Yes Yes   Si mg by PEG Tube route every six (6) hours.       Facility-Administered Medications: None       Hospital Medications:  Current Facility-Administered Medications   Medication Dose Route Frequency    sodium chloride (NS) flush 5-10 mL  5-10 mL IntraVENous Q8H    sodium chloride (NS) flush 5-10 mL  5-10 mL IntraVENous PRN    0.9% sodium chloride infusion  75 mL/hr IntraVENous CONTINUOUS    pantoprazole (PROTONIX) 40 mg in sodium chloride 0.9 % 10 mL injection  40 mg IntraVENous BID    amLODIPine (NORVASC) tablet 10 mg  10 mg Oral DAILY    atorvastatin (LIPITOR) tablet 40 mg  40 mg Oral DAILY    cloNIDine HCl (CATAPRES) tablet 0.2 mg  0.2 mg Oral Q12H    vancomycin 50 mg/mL oral solution (compounded) 125 mg  125 mg Oral Q6H    nitroglycerin (NITROBID) 2 % ointment        hydrALAZINE (APRESOLINE) 20 mg/mL injection 10 mg  10 mg IntraVENous Q6H PRN       Social History:  Social History   Substance Use Topics    Smoking status: Never Smoker    Smokeless tobacco: Never Used    Alcohol use No       Family History:  History reviewed. No pertinent family history.     Review of Systems:  Unable to obtain due to the patient's condition    Objective:   Patient Vitals for the past 8 hrs:   BP Temp Pulse Resp SpO2 Height Weight   12/01/17 0911 - - - - - 5' 1\" (1.549 m) -   12/01/17 0700 167/87 98.2 °F (36.8 °C) 96 18 97 % - -   12/01/17 0300 161/84 98 °F (36.7 °C) 96 21 100 % 5' 1\" (1.549 m) 51.3 kg (113 lb 1.5 oz)             EXAM:     CONST:  Lying in bed, no acute distress   NEURO:  Awake, aphasic   HEENT: EOMI, no scleral icterus   LUNGS: clear to ausculation   CARD:  regular rate and rhythm, S1 S2   ABD:  soft, left sided tenderness, no rebound, bowel sounds (+) all 4 quadrants, no masses, non distended, PEG site clean and dry (small amount of dark fluid in the tube)   EXT:  no edema, warm   PSYCH: not anxious     Data Review     Recent Labs      12/01/17   0105  11/30/17   1840   WBC  17.9*  13.6*   HGB  11.5*  12.1   HCT  36.3*  37.7   PLT  505*  478*     Recent Labs      12/01/17 0115 11/30/17   1840   NA  135*  134*   K  4.2  3.8   CL  97  94*   CO2  28  31   BUN  17  21*   CREA  1.12  1.43*   GLU  138*  185*   CA  9.4  10.2*     Recent Labs      12/01/17   0115 11/30/17   1840   SGOT  44*  33   AP  129*  128*   TP  9.9*  10.1*   ALB  3.0*  3.2*   GLOB  6.9*  6.9*     No results for input(s): INR, PTP, APTT in the last 72 hours. No lab exists for component: INREXT       Assessment:   · GI bleed: Hgb 11.5.  VSS. Hemoccult positive. Bright red blood per rectum and hematemesis. Aspirin and Plavix were held. Patient Active Problem List   Diagnosis Code    GI bleed K92.2     Plan:   · NPO  · Will consider EGD  · Agree with BID PPI  · Trend CBC and transfuse as necessary. · If pt begins to bleed briskly, order CTA GI bleed protocol and immediately consult IR for intervention if the CTA is positive. · Will need to obtain colorectal records and discuss history further with the patient's son. I called the patient's son and he noted that he was on his way to the hospital.  · Thank you for allowing me to participate in care of Erick Buenrostro       Signed By: Luis Carlos Kebede     12/1/2017  10:49 AM         Discussed course of events with patient's son who was at the bedside. Patient has been followed by GI and surgical oncology (Dr. Kike Ivy) at [facility]. Plans noted to have patient transferred to [facility]. Will hold off on any endoscopic evaluation at this time. Addendum:  I agree with the above assessment/plan. The patient has a complicated history and is followed by GI, oncology, and cardiac surgery at [facility]. He is being transferred to [facility] and I agree with this plan. GI service should see the patient once he arrives to [facility].

## 2017-12-01 NOTE — ROUTINE PROCESS
IDR/SLIDR Summary          Patient: Rachel Magana MRN: [id number]    Age: 69 y.o. YOB: 1950 Room/Bed: [facility]   Admit Diagnosis: GI bleed  Principal Diagnosis: GI bleed   Goals: Gi consult  Readmission: YES  Quality Measure: Not applicable  VTE Prophylaxis: Mechanical  Influenza Vaccine screening completed? YES  Pneumococcal Vaccine screening completed? NO  Mobility needs: Yes   Nutrition plan:Yes  Consults:P.T, O.T. and Case Management    Financial concerns:Yes  Escalated to CM? YES  RRAT Score: 18   Interventions:tbd  Testing due for pt today?  NO  LOS: 1 days Expected length of stay 3 days  Discharge plan: tbd   PCP: None  Transportation needs: Yes    Days before discharge:two or more days before discharge   Discharge disposition: SNF    Signed:     Vidhya Fernandez RN  12/1/2017  9:42 AM

## 2017-12-01 NOTE — ED PROVIDER NOTES
HPI Comments: 69 y.o. male with past medical history significant for CAD, colo-rectal cancer, stroke, HTN and GERD who presents via EMS from [place] [address] accompanied by his son and wife with chief complaint of hematochezia. Pt's history is provided by the son. Pt noticed hematochezia yesterday after taking a stool softener to treat constipation. Nausea and vomiting onset this morning. Pt has a PEG tube in place, but tolerates some PO intake. However, has been exhibiting decreased appetite over the last 3 days. Pt has a history of colo-rectal cancer for which he is currently being treated. Pt has been residing at [place] [address] for the last month following open heart surgery. Pt sustained a stroke 3 days post-surgery and has had difficulty ambulating and speaking since. Pt's son notes pt has improved with both ambulation and speech over the last month and prior to today's symptoms, was up for discharge from the facility next week. Pertinent negatives include fever. There are no other acute medical concerns at this time. Social hx: denies tobacco use; denies EtOH use; denies illicit drug use  PCP: None    Note written by Eliceo Madden, as dictated by Ana Berrios MD 6:45 PM        The history is provided by a relative and the spouse (son). A  was used (son). Past Medical History:   Diagnosis Date    CAD (coronary artery disease)     Cancer ([facility] [address]     Colon    Gastrointestinal disorder     GERD    Hypertension     Stroke [facility])        History reviewed. No pertinent surgical history. History reviewed. No pertinent family history. Social History     Social History    Marital status:      Spouse name: N/A    Number of children: N/A    Years of education: N/A     Occupational History    Not on file.      Social History Main Topics    Smoking status: Never Smoker    Smokeless tobacco: Never Used    Alcohol use No    Drug use: No    Sexual activity: Not on file     Other Topics Concern    Not on file     Social History Narrative    No narrative on file         ALLERGIES: Review of patient's allergies indicates no known allergies. Review of Systems   Constitutional: Negative for chills, diaphoresis, fatigue and fever. HENT: Negative for congestion, rhinorrhea, sinus pressure, sore throat, trouble swallowing and voice change. Eyes: Negative for photophobia and visual disturbance. Respiratory: Negative for cough, chest tightness and shortness of breath. Cardiovascular: Negative for chest pain, palpitations and leg swelling. Gastrointestinal: Positive for nausea and vomiting. Negative for abdominal pain, blood in stool, constipation and diarrhea. Musculoskeletal: Negative for arthralgias, myalgias and neck pain. Neurological: Negative for dizziness, weakness, light-headedness, numbness and headaches. Vitals:    11/30/17 1741   BP: 170/86   Pulse: 79   Resp: 16   Temp: 98.2 °F (36.8 °C)   SpO2: 98%            Physical Exam   Constitutional: He is oriented to person, place, and time. He appears well-developed and well-nourished. He appears lethargic. He appears ill. No distress. HENT:   Head: Normocephalic and atraumatic. Nose: Nose normal.   Mouth/Throat: Oropharynx is clear and moist. No oropharyngeal exudate. Eyes: Conjunctivae and EOM are normal. Right eye exhibits no discharge. Left eye exhibits no discharge. No scleral icterus. Neck: Normal range of motion. Neck supple. No JVD present. No tracheal deviation present. No thyromegaly present. Cardiovascular: Normal rate, regular rhythm, normal heart sounds and intact distal pulses. Exam reveals no gallop and no friction rub. No murmur heard. Pulmonary/Chest: Effort normal and breath sounds normal. No stridor. No respiratory distress. He has no wheezes. He has no rales. He exhibits no tenderness. Abdominal: Bowel sounds are normal. He exhibits no distension and no mass.  There is no tenderness. There is no rebound. PEG tube in LUQ   Musculoskeletal: Normal range of motion. He exhibits no edema or tenderness. Lymphadenopathy:     He has no cervical adenopathy. Neurological: He is oriented to person, place, and time. He appears lethargic. No cranial nerve deficit. Coordination normal.   Skin: Skin is warm. No rash noted. He is diaphoretic. No erythema. No pallor. Midline sternotomy scar. Psychiatric: He has a normal mood and affect. His behavior is normal. Judgment and thought content normal.   Nursing note and vitals reviewed. Note written by Eliceo Langley, as dictated by Cira Walker MD 6:45 PM     MDM  Number of Diagnoses or Management Options  Gastrointestinal hemorrhage, unspecified gastrointestinal hemorrhage type:      Amount and/or Complexity of Data Reviewed  Clinical lab tests: ordered and reviewed  Tests in the radiology section of CPT®: ordered and reviewed  Review and summarize past medical records: yes  Discuss the patient with other providers: yes    Risk of Complications, Morbidity, and/or Mortality  Presenting problems: moderate  Diagnostic procedures: moderate  Management options: moderate    Patient Progress  Patient progress: stable    ED Course       Procedures      8:33 AM  Patient is being admitted to the hospital.  The results of their tests and reasons for their admission have been discussed with them and/or available family. They convey agreement and understanding for the need to be admitted and for their admission diagnosis. Consultation has been made with the inpatient physician specialist for hospitalization. LABORATORY TESTS:  No results found for this or any previous visit (from the past 12 hour(s)). IMAGING RESULTS:  CT ABD PELV WO CONT   Final Result      XR CHEST PORT   Final Result        No results found.     MEDICATIONS GIVEN:  Medications   nitroglycerin (NITROBID) 2 % ointment (  Canceled Entry 12/1/17 0200)   sodium chloride 0.9 % bolus infusion 1,000 mL (0 mL IntraVENous IV Completed 11/30/17 2155)   ondansetron (ZOFRAN) injection 4 mg (4 mg IntraVENous Given 11/30/17 1955)   pantoprazole (PROTONIX) 40 mg in sodium chloride 0.9 % 10 mL injection (40 mg IntraVENous Given 11/30/17 2049)   nitroglycerin (NITROBID) 2 % ointment 1 Inch (1 Inch Topical Given 12/1/17 0115)   piperacillin-tazobactam (ZOSYN) 3.375 g in  ml premix/cmpd (3.375 g IntraVENous Given 12/1/17 1149)       IMPRESSION:  1. Gastrointestinal hemorrhage, unspecified gastrointestinal hemorrhage type        PLAN:  1.  Admit to [place], MD

## 2017-12-01 NOTE — PROGRESS NOTES
Problem: Falls - Risk of  Goal: *Absence of Falls  Document Thomas Fall Risk and appropriate interventions in the flowsheet.    Outcome: Progressing Towards Goal  Fall Risk Interventions:  Mobility Interventions: Bed/chair exit alarm, Communicate number of staff needed for ambulation/transfer, OT consult for ADLs, Patient to call before getting OOB, PT Consult for mobility concerns, Utilize walker, cane, or other assitive device              Elimination Interventions: Bed/chair exit alarm, Call light in reach, Patient to call for help with toileting needs, Toileting schedule/hourly rounds

## 2017-12-01 NOTE — H&P
[address]   HISTORY AND PHYSICAL       Name:  Anthony Taylor   MR#:  [id number]   :  1950   Account #:  [de-identified]        Date of Adm:  2017       CHIEF COMPLAINT: GI bleeding. HISTORY OF PRESENT ILLNESS: The patient is a 20-year-old male   with past medical history of coronary artery disease, colon cancer,   stroke, hypertension and GERD, who presents to the hospital with the   above mentioned symptoms. The patient has a history of recent stroke   and thus history was obtained from the son, who is present at the   bedside. The son reports that patient had a colonoscopy done and was   diagnosed with colon cancer and at almost the same time the patient   also had some \"heart problems\" and was found to have coronary   artery disease. The patient was taken for coronary artery bypass   grafting at [facility] about a month, month and half back, underwent the   surgery and while he was in the ICU, had a stroke. Per son, the patient   was unable to walk and also had a lot of trouble swallowing and trouble   with speech. The patient eventually had a PEG tube placed and was   sent to [facility]. The patient is recovering well. Per son, he has   regained some speech, he is able to swallow some of his food, and he   is able to walk with the help of a walker and has good strength in his   arms. The son reports that the patient was evaluated by Colorectal   Surgery recently and they are planning to operate on him for colon   cancer after [place] if all the other morbidities are optimized. Per   son, the patient started developing some poor appetite associated with   constipation about 2 days back. He was given a stool softener and   started complaining of some nausea and vomiting today, and then was   found to have some stools with dark maroon blood yesterday.  The   patient also started having some nausea and vomiting associated with   some hematemesis earlier today and the son got concerned and   decided to bring him to the hospital. Currently, the patient appears to   be stable, resting in bed. Per son, the patient has not complained of   any headache, blurry vision, sore throat, trouble swallowing, trouble   with speech other than usual. No chest pain, no shortness of breath. No recent cough, no abdominal pain. No change in neurological status   or falls, injuries or any other concerns or problems. PAST MEDICAL HISTORY: See above. HOME MEDICATIONS: Currently the son is not sure what the   medications the patient is taking and he is in the process of obtaining   the list from [facility]. We will update and reconcile medical   reconciliation once the medications are available. SOCIAL HISTORY: No history of tobacco abuse, alcohol use, or IV   drug abuse. Lives at home. ALLERGIES: NO KNOWN DRUG ALLERGIES. FAMILY HISTORY: Could not be obtained from the patient secondary   to history of stroke and aphasia. PHYSICAL EXAMINATION   VITAL SIGNS: Temperature 98.2, pulse 76, respiratory rate 18, blood   pressure 127/78, pulse oximetry 98% on room air. GENERAL: Alert x1, partially aphasic male, appears to be stated age. HEENT: Pupils equal and reactive to light. Dry mucous membranes. Tympanic membranes clear. NECK: Supple. CHEST: Clear to auscultation bilaterally. CORONARY: S1, S2 were heard. ABDOMEN: Soft, nontender, nondistended. Bowel sounds are   physiological. PEG tube insertion site appears to be clean, dry and   intact. EXTREMITIES: No clubbing, no cyanosis, no edema. NEUROPSYCHIATRIC: Limited exam, but during the exam the patient   has 4/5 strength in all extremities. Aphasic and has good sensation. SKIN: Warm. LABORATORY DATA: White count 13.6, hemoglobin 12.1, hematocrit   37.7, platelets 100.  Sodium , potassium 3.8, chloride 94,   bicarbonate 31, anion gap 9, glucose 185, BUN 21, creatinine 1.43,   calcium 10.2, bilirubin total 0.5, ALT 38, AST 15, alkaline phosphatase   138, lactic acid 2.1. Stool occult blood is positive. X-ray of the chest   shows no acute cardiopulmonary process. EKG shows normal sinus   rhythm with inferior changes. ASSESSMENT AND PLAN   1. Gastrointestinal bleeding, appears to be both upper and lower. The   patient will be admitted on a telemetry bed. Start the patient on   Protonix b.i.d., as Protonix continuous infusion is not available from the   pharmacy. Will keep the patient n.p.o. for now. Will provide gentle   intravenous hydration and supportive care. Monitor hemoglobin and   hematocrit every 8 hours. Gastroenterology consult has been   requested. I spoke with the son and at this point of time, we may have   to hold antiplatelets for tonight. The risks were explained to him. There   is always a risk that the patient may have a recurrence of his stroke   and the son agrees with holding the anticoagulation for tonight and   reassess again in the morning. Will obtain records from the patient's   colorectal surgeon and further intervention will be per hospital course. Again, the risks and benefits of holding antiplatelets and   anticoagulation was explained to the son, and he agrees that we can   hold the antiplatelets for tonight. 2. History of coronary artery disease. Continue to monitor. Continue   home medications once the medication list is available. 3. Hypertension. Currently the patient appears to be suboptimally   controlled. Will continue home medications and continue to monitor. Optimize blood pressure control. 4. Gastrointestinal and deep vein thrombosis prophylaxis. The patient   will be on sequential compression devices.         MD SAY Catalan MP   D:  11/30/2017   21:45   T:  11/30/2017   22:38   Job #:  [id number]

## 2017-12-01 NOTE — ED NOTES
Report received from [facility], patient is resting quietly, easily awakened, NADN, family is gone at this time, patient denies any needs

## 2017-12-01 NOTE — PROGRESS NOTES
Hospitalist Progress Note  Beto Renteria MD  Answering service: [phone] OR [phone] from in house phone        Date of Service:  2017  NAME:  Kenny Cardona  :  1950  MRN:  [id number]      Admission Summary:   The patient is a 78-year-old male with past medical history of coronary artery disease, colon cancer,   stroke, hypertension and GERD, who presents to the hospital with the BRBPR. The patient has a history of recent stroke And thus history was obtained from the son, who is present at the   bedside. The son reports that patient had a colonoscopy done and was   diagnosed with colon cancer     Interval history / Subjective:   D/W RN will be seen by GI has aphasia from recent stroke      Assessment & Plan:     1. Gastrointestinal bleeding, appears to be both upper and lower. - hold asa / plavix which was started for a recent stroke    - h/o colon ca - need report from colorectal surgeons office  - cbc q 12 hrs and IV meds cont PPI     2. UTI POA-  - Will start zosyn follow up cultures  - pt was on oral vanc for ? c diff need records   - cant get it for NPO status  - GI to evaluate    2. History of coronary artery disease. Continue to monitor.  - cont home meds  Anti plt stopped for above    3. Hypertension. Currently the patient appears to be suboptimally   Controlled.   - cont IV meds for now    Code status: Full  DVT prophylaxis: SCD    Care Plan discussed with: Patient/Family and Nurse  Disposition: TBD spoke to son at bedside pt follows at [facility] he had CABG / stroke in ICU have colon ca and s/p colonic stent and now possibly bleeding from colon mass for asa/ plavix , I d/w son called [facility] and requested a transfer to Dr. Stacey Arce who is a oncological [employer]  Date Reviewed: 2017          Codes Class Noted POA    * (Principal)GI bleed ICD-10-CM: K92.2  ICD-9-CM: 578.9  2017 Unknown                Review of Systems:   A comprehensive review of systems was negative. Vital Signs:    Last 24hrs VS reviewed since prior progress note. Most recent are:  Visit Vitals    /87    Pulse 98    Temp 98.3 °F (36.8 °C)    Resp 17    Ht 5' 1\" (1.549 m)    Wt 51.3 kg (113 lb 1.5 oz)    SpO2 98%    BMI 21.37 kg/m2         Intake/Output Summary (Last 24 hours) at 12/01/17 1110  Last data filed at 12/01/17 0400   Gross per 24 hour   Intake                0 ml   Output                0 ml   Net                0 ml        Physical Examination:             Constitutional:  No acute distress, cooperative, pleasant    ENT:  Oral mucous moist, oropharynx benign. Neck supple,    Resp:  CTA bilaterally. No wheezing/rhonchi/rales. No accessory muscle use   CV:  Regular rhythm, normal rate, no murmurs, gallops, rubs    GI:  Soft, non distended, non tender. normoactive bowel sounds, no hepatosplenomegaly     Musculoskeletal:  No edema, warm, 2+ pulses throughout    Neurologic:  Moves all extremities. Psych:  Good insight, Not anxious nor agitated. Data Review:    I personally reviewed  Image and labs      Labs:     Recent Labs      12/01/17   0105  11/30/17   1840   WBC  17.9*  13.6*   HGB  11.5*  12.1   HCT  36.3*  37.7   PLT  505*  478*     Recent Labs      12/01/17   0115  11/30/17   1840   NA  135*  134*   K  4.2  3.8   CL  97  94*   CO2  28  31   BUN  17  21*   CREA  1.12  1.43*   GLU  138*  185*   CA  9.4  10.2*     Recent Labs      12/01/17   0115  11/30/17   1840   SGOT  44*  33   ALT  37  38   AP  129*  128*   TBILI  0.4  0.5   TP  9.9*  10.1*   ALB  3.0*  3.2*   GLOB  6.9*  6.9*     No results for input(s): INR, PTP, APTT in the last 72 hours. No lab exists for component: INREXT   No results for input(s): FE, TIBC, PSAT, FERR in the last 72 hours. No results found for: FOL, RBCF   No results for input(s): PH, PCO2, PO2 in the last 72 hours.   No results for input(s): CPK, CKNDX, TROIQ in the last 72 hours.    No lab exists for component: CPKMB  No results found for: CHOL, CHOLX, CHLST, CHOLV, HDL, LDL, LDLC, DLDLP, TGLX, TRIGL, TRIGP, CHHD, CHHDX  Lab Results   Component Value Date/Time    Glucose (POC) 127 12/01/2017 08:04 AM     Lab Results   Component Value Date/Time    Color RED 12/01/2017 09:18 AM    Appearance TURBID 12/01/2017 09:18 AM    Specific gravity 1.015 12/01/2017 09:18 AM    pH (UA) 7.0 12/01/2017 09:18 AM    Protein >300 12/01/2017 09:18 AM    Glucose 100 12/01/2017 09:18 AM    Ketone 15 12/01/2017 09:18 AM    Urobilinogen 2.0 12/01/2017 09:18 AM    Nitrites POSITIVE 12/01/2017 09:18 AM    Leukocyte Esterase LARGE 12/01/2017 09:18 AM    Epithelial cells FEW 12/01/2017 09:18 AM    Bacteria 4+ 12/01/2017 09:18 AM    WBC  12/01/2017 09:18 AM    RBC >100 12/01/2017 09:18 AM         Medications Reviewed:     Current Facility-Administered Medications   Medication Dose Route Frequency    sodium chloride (NS) flush 5-10 mL  5-10 mL IntraVENous Q8H    sodium chloride (NS) flush 5-10 mL  5-10 mL IntraVENous PRN    0.9% sodium chloride infusion  75 mL/hr IntraVENous CONTINUOUS    pantoprazole (PROTONIX) 40 mg in sodium chloride 0.9 % 10 mL injection  40 mg IntraVENous BID    amLODIPine (NORVASC) tablet 10 mg  10 mg Oral DAILY    atorvastatin (LIPITOR) tablet 40 mg  40 mg Oral DAILY    cloNIDine HCl (CATAPRES) tablet 0.2 mg  0.2 mg Oral Q12H    vancomycin 50 mg/mL oral solution (compounded) 125 mg  125 mg Oral Q6H    nitroglycerin (NITROBID) 2 % ointment        piperacillin-tazobactam (ZOSYN) 3.375 g in  ml premix/cmpd  3.375 g IntraVENous Q8H    . PHARMACY TO SUBSTITUTE PER PROTOCOL    Per Protocol    hydrALAZINE (APRESOLINE) 20 mg/mL injection 10 mg  10 mg IntraVENous Q6H PRN     ______________________________________________________________________  EXPECTED LENGTH OF STAY: - - -  ACTUAL LENGTH OF STAY:          1                 Erick Lopez MD

## 2017-12-01 NOTE — DISCHARGE INSTRUCTIONS
Discharge Instructions       PATIENT ID: Kaylyn Hall  MRN: [id number]   YOB: 1950    DATE OF ADMISSION: 11/30/2017  5:35 PM    DATE OF DISCHARGE: 12/1/2017    PRIMARY CARE PROVIDER: None     ATTENDING PHYSICIAN: Patricia Umanzor MD  DISCHARGING PROVIDER: Patricia Umanzor MD    To contact this individual call [phone] and ask the  to page. If unavailable ask to be transferred the Adult Hospitalist Department. DISCHARGE DIAGNOSES GIB due to colon ca     CONSULTATIONS: IP CONSULT TO HOSPITALIST  IP CONSULT TO GASTROENTEROLOGY    PROCEDURES/SURGERIES: * No surgery found *    PENDING TEST RESULTS:   At the time of discharge the following test results are still pending:     FOLLOW UP APPOINTMENTS:   Follow-up Information     Follow up With Details Comments Contact Info    None  transfer to [facility] None ([phone]) Patient stated that they have no PCP             ADDITIONAL CARE RECOMMENDATIONS:     DIET: NPO      ACTIVITY: Bedrest    WOUND CARE:     EQUIPMENT needed:       DISCHARGE MEDICATIONS:   See Medication Reconciliation Form    · It is important that you take the medication exactly as they are prescribed. · Keep your medication in the bottles provided by the pharmacist and keep a list of the medication names, dosages, and times to be taken in your wallet. · Do not take other medications without consulting your doctor. NOTIFY YOUR PHYSICIAN FOR ANY OF THE FOLLOWING:   Fever over 101 degrees for 24 hours. Chest pain, shortness of breath, fever, chills, nausea, vomiting, diarrhea, change in mentation, falling, weakness, bleeding. Severe pain or pain not relieved by medications. Or, any other signs or symptoms that you may have questions about.       DISPOSITION:    Home With:   OT  PT  HH  RN       SNF/Inpatient Rehab/LTAC    Independent/assisted living    Hospice   x Other: [facility]     CDMP Checked:   Yes x     PROBLEM LIST Updated:  Yes x       Signed:   Patricia Umanzor MD  12/1/2017  1:37 PM

## 2017-12-01 NOTE — PROGRESS NOTES
Admission Medication Reconciliation:    Information obtained from:  [facility] MAR/RN at [facility]    Comments/Recommendations: Updated PTA meds/reviewed patient's allergies. 1)  All medications added for 1st time Medication Reconciliation    2)  Last doses as below. Patient did receive AM meds through PEG Today however shortly after (exact time unknown) patient vomited. Zofran order placed by facility for Q 4hours prn x 24hours. Last dose at facility was at 1400       Significant PMH/Disease States:   Past Medical History:   Diagnosis Date    CAD (coronary artery disease)     Cancer ([address]     Colon    Gastrointestinal disorder     GERD    Hypertension     Stroke [facility])        Chief Complaint for this Admission:    Chief Complaint   Patient presents with    Melena    Vomiting       Allergies:  Review of patient's allergies indicates no known allergies. Prior to Admission Medications:   Prior to Admission Medications   Prescriptions Last Dose Informant Patient Reported? Taking? L. ACIDOPHILUS/L.BULGARICUS (FLORANEX PO) 2017 at AM  Yes Yes   Si Packet by PEG Tube route three (3) times daily. acetaminophen (TYLENOL) 160 mg/5 mL liquid   Yes Yes   Sig: 320 mg by PEG Tube route every six (6) hours as needed for Pain. acetaminophen (TYLENOL) 325 mg tablet 2017 at AM  Yes Yes   Si mg by PEG Tube route every eight (8) hours. amLODIPine (NORVASC) 10 mg tablet 2017 at AM  Yes Yes   Sig: 10 mg by PEG Tube route daily. aspirin 81 mg chewable tablet 2017 at AM  Yes Yes   Si mg by PEG Tube route daily. atorvastatin (LIPITOR) 40 mg tablet 2017 at AM  Yes Yes   Si mg by PEG Tube route daily. cloNIDine HCl (CATAPRES) 0.2 mg tablet 2017 at AM  Yes Yes   Si.2 mg by PEG Tube route every twelve (12) hours. clopidogrel (PLAVIX) 75 mg tab 2017 at AM  Yes Yes   Si mg by PEG Tube route daily.    famotidine (PEPCID) 40 mg/5 mL (8 mg/mL) suspension 2017 at HS  Yes Yes   Si tsp by PEG Tube route nightly. guar gum (BENEFIBER) packet 2017 at AM  Yes Yes   Si Packet by PEG Tube route three (3) times daily (with meals). ondansetron (ZOFRAN ODT) 4 mg disintegrating tablet 2017 at 1400  Yes Yes   Si mg by PEG Tube route every four (4) hours as needed for Nausea. X 24hours   vancomycin 125 mg/2.5 mL syrg 2017 at AM  Yes Yes   Si mg by PEG Tube route every six (6) hours.       Facility-Administered Medications: None

## 2017-12-01 NOTE — PROGRESS NOTES
I returned page from nurse who reports Vancomycin po ordered but patient is NPO. WBC is elevated but patient is afebrile without any identified infectious source. As patient is NPO with no definite infection, oral Vancomycin was held.

## 2017-12-02 NOTE — PROGRESS NOTES
Patient discharged\transferred to [facility] at this time. Report called to [place], RN on CCU ([phone]). Also report given to [place] of transport. All questions answered.

## 2017-12-02 NOTE — PROGRESS NOTES
Request for transport with [facility] for 10:00  time sent via varinode. ALS clarified in message to [facility] for patient's safe transition to [facility]. [facility]7th floor - Bed 110. EMTALA HAS BEEN INITIATED. Accepting physician:  Dr. Lu Kehr. [facility] Transportation Line:  [phone], option 2. Jeremías White RN    UPDATE:  12/1/17 8:05PM Received confirmation from [facility] for 10:00pm transport time to [facility].

## 2017-12-02 NOTE — PROGRESS NOTES
I was called by nurse requesting me to sign patient's EMTLA form prior to his transfer to [facility]. Patient was already on transfer stretcher with EMS transfer EMTs present. Prior to signing form, I have seen and evaluated patient. Pt seen and evaluated. VS:  T = 98.0 F  BP= 161/85  HR= 75  RR= 20   O2sat= 98% on room air  PE:  GEN-NAD  PSYCH-awake, alert, responsive   NEURO-GCS 14 (E4 V4  M6). MAEX4. No facial droop. HEENT-NCAT/pupils 2 mm reactive bilateral. Oropharynx clear. NECK-supple, trachea midline  LUNGS-CTA B  HEART-RRR  ABD-soft, NT,ND, + BS. No R/G/R.  PEG tube in place. VASCULAR-2+ radial/1+DP pulses bilateral. No C/C/E  SKIN-warm/dry  MS-no calf tenderness    A/P:  H/o CABG/ stroke/ colon cancer. Followed by [facility] oncological surgeon with plans to transfer to [address] [place] [address] for transfer to their service. I have evaluate patient who at current is hemodynamically stable. I have reviewed and signed the [address] form. Patient is discharged to transfer to [facility].

## 2017-12-04 LAB
BACTERIA SPEC CULT: ABNORMAL
CC UR VC: ABNORMAL
SERVICE CMNT-IMP: ABNORMAL